# Patient Record
Sex: FEMALE | Race: WHITE | Employment: FULL TIME | URBAN - METROPOLITAN AREA
[De-identification: names, ages, dates, MRNs, and addresses within clinical notes are randomized per-mention and may not be internally consistent; named-entity substitution may affect disease eponyms.]

---

## 2023-11-24 ENCOUNTER — HOSPITAL ENCOUNTER (OUTPATIENT)
Age: 33
Discharge: HOME | End: 2023-11-24
Payer: COMMERCIAL

## 2023-11-24 VITALS
TEMPERATURE: 97 F | SYSTOLIC BLOOD PRESSURE: 134 MMHG | OXYGEN SATURATION: 99 % | DIASTOLIC BLOOD PRESSURE: 69 MMHG | RESPIRATION RATE: 18 BRPM | HEART RATE: 88 BPM

## 2023-11-24 DIAGNOSIS — H60.391 INFECTION OF RIGHT EAR LOBE: Primary | ICD-10-CM

## 2023-11-24 RX ORDER — AMOXICILLIN AND CLAVULANATE POTASSIUM 875; 125 MG/1; MG/1
1 TABLET, FILM COATED ORAL 2 TIMES DAILY
COMMUNITY
Start: 2023-11-18 | End: 2023-11-28

## 2023-11-24 NOTE — DISCHARGE INSTRUCTIONS
Please use warm compress to the area. Continue to take antibiotics that were prescribed. Follow-up with your primary care provider as needed.

## 2023-11-24 NOTE — ED INITIAL ASSESSMENT (HPI)
Pt complaining of right ear piercing infection for a week. +pus like drainage. Pt is on day 6 on augmentin for a sinus infection.